# Patient Record
Sex: FEMALE | Race: BLACK OR AFRICAN AMERICAN
[De-identification: names, ages, dates, MRNs, and addresses within clinical notes are randomized per-mention and may not be internally consistent; named-entity substitution may affect disease eponyms.]

---

## 2017-08-15 ENCOUNTER — HOSPITAL ENCOUNTER (EMERGENCY)
Dept: HOSPITAL 54 - ER | Age: 37
Discharge: HOME | End: 2017-08-15
Payer: COMMERCIAL

## 2017-08-15 VITALS — SYSTOLIC BLOOD PRESSURE: 124 MMHG | DIASTOLIC BLOOD PRESSURE: 91 MMHG

## 2017-08-15 VITALS — HEIGHT: 60 IN | WEIGHT: 111 LBS | BODY MASS INDEX: 21.79 KG/M2

## 2017-08-15 DIAGNOSIS — R10.2: Primary | ICD-10-CM

## 2017-08-15 DIAGNOSIS — F12.10: ICD-10-CM

## 2017-08-15 LAB
HCG UR QL: NEGATIVE
KETONES UR STRIP-MCNC: 15 MG/DL
PH UR STRIP: 6.5 [PH] (ref 5–8)
RBC #/AREA URNS HPF: (no result) /HPF (ref 0–2)
UROBILINOGEN UR STRIP-MCNC: 1 EU/DL
WBC #/AREA URNS HPF: (no result) /HPF (ref 0–3)

## 2017-08-15 PROCEDURE — Z7610: HCPCS

## 2017-08-15 PROCEDURE — A4606 OXYGEN PROBE USED W OXIMETER: HCPCS

## 2017-11-02 ENCOUNTER — HOSPITAL ENCOUNTER (EMERGENCY)
Dept: HOSPITAL 10 - FTE | Age: 37
Discharge: HOME | End: 2017-11-02
Payer: COMMERCIAL

## 2017-11-02 VITALS
WEIGHT: 111.33 LBS | BODY MASS INDEX: 20.49 KG/M2 | HEIGHT: 62 IN | HEIGHT: 62 IN | WEIGHT: 111.33 LBS | BODY MASS INDEX: 20.49 KG/M2

## 2017-11-02 DIAGNOSIS — R10.2: ICD-10-CM

## 2017-11-02 DIAGNOSIS — Z3A.01: ICD-10-CM

## 2017-11-02 DIAGNOSIS — O26.891: Primary | ICD-10-CM

## 2017-11-02 PROCEDURE — 76801 OB US < 14 WKS SINGLE FETUS: CPT

## 2017-11-02 PROCEDURE — 85025 COMPLETE CBC W/AUTO DIFF WBC: CPT

## 2017-11-02 PROCEDURE — 86900 BLOOD TYPING SEROLOGIC ABO: CPT

## 2017-11-02 PROCEDURE — 84702 CHORIONIC GONADOTROPIN TEST: CPT

## 2017-11-02 PROCEDURE — 81003 URINALYSIS AUTO W/O SCOPE: CPT

## 2017-11-02 PROCEDURE — 36415 COLL VENOUS BLD VENIPUNCTURE: CPT

## 2017-11-02 PROCEDURE — 87591 N.GONORRHOEAE DNA AMP PROB: CPT

## 2017-11-02 PROCEDURE — 81001 URINALYSIS AUTO W/SCOPE: CPT

## 2017-11-02 PROCEDURE — 84703 CHORIONIC GONADOTROPIN ASSAY: CPT

## 2017-11-02 PROCEDURE — 76817 TRANSVAGINAL US OBSTETRIC: CPT

## 2017-11-02 PROCEDURE — 86901 BLOOD TYPING SEROLOGIC RH(D): CPT

## 2017-11-02 NOTE — RADRPT
PROCEDURE:   US Pelvis. 

 

CLINICAL INDICATION:   Pelvic pain 

 

TECHNIQUE:   Multiple sonographic images of the pelvis were obtained utilizing a transabdominal and 
endovaginal technique.   The images were reviewed on a PACS workstation. 

 

COMPARISON:   None. 

 

FINDINGS:

The uterus is normal in size and demonstrates a normal appearance of the myometrium.  The endometria
l stripe is heterogeneous in appearance and has the thickness of 15 mm.

No intrauterine gestation is noted.

The ovaries are normal in size and echogenicity. Normal Doppler flow is identified in both ovaries.

The right ovary measures 3.2 x 1.8 x 2.1 cm.

The left ovary measures 4.7 x 2.9 x 3.7 cm. There is a 2.3 cm hemorrhagic cyst in the left ovary.

There is a small amount of free fluid in the pelvis.

 

 

RPTAT: AA

 

 

IMPRESSION:

No intrauterine gestation visualized.

Small hemorrhagic cyst in the left ovary.

Small amount of free fluid in the cul-de-sac.

Differential diagnosis includes early pregnancy, missed  or ectopic pregnancy.  

Follow-up ultrasound and HCG levels is recommended.

_____________________________________________ 

.Jaguar Tillman MD, MD           Date    Time 

Electronically viewed and signed by .Jaguar Tillman MD, MD on 2017 19:48 

 

D:  2017 19:48  T:  2017 19:48

.S/

## 2017-11-02 NOTE — ERD
ER Documentation


Chief Complaint


Chief Complaint


burning urination x 1 week





HPI


37-year-old female presents emergency room painful urination, urgency and 

frequency for a week now.  She found an old prescription for Cipro and has 

taken it for 3 days twice a day but reports that it was an old prescription and 

 in .  There is associated suprapubic abdominal pain, she describes 

pelvic pain.  She states that she is sexually active with one partner her 

boyfriend, denies any excessive vaginal discharge, vaginal odor, vaginal 

bleeding.  Her last menstrual period was on .  She denies fevers or 

chills, vomiting or diarrhea.





ROS


All systems reviewed and are negative except as per history of present illness.





Medications


Home Meds


Active Scripts


Acetaminophen* (Tylophen*) 500 Mg Capsule, 1 CAP PO Q6H Y for PAIN AND OR 

ELEVATED TEMP, #20 CAP


   Prov:MEGAN QURESHI PA-C         17


Cephalexin* (Keflex*) 500 Mg Capsule, 500 MG PO TID for 7 Days, CAP


   Prov:MEGAN QURESHI PA-C         17


Reported Medications


[None]   No Conflict Check


   7/2/10





Allergies


Allergies:  


Coded Allergies:  


     No Known Drug Allergy (Verified  Allergy, Mild, 14)





PMhx/Soc


History of Surgery:  Yes (TUMOR REMOVED ABDOMEN, C SECTION X2, BREAST IMPLANT)


Anesthesia Reaction:  No


Hx Neurological Disorder:  No


Hx Respiratory Disorders:  No


Hx Cardiac Disorders:  No


Hx Psychiatric Problems:  No


Hx Miscellaneous Medical Probl:  No


Hx Alcohol Use:  Yes (OCCASSIONALLY)


Hx Substance Use:  No


Hx Tobacco Use:  No





Physical Exam


Vitals





Vital Signs








  Date Time  Temp Pulse Resp B/P Pulse Ox O2 Delivery O2 Flow Rate FiO2


 


17 15:53 97.6 88 130 67/ 98   








Physical Exam


General: Well-developed, well-nourished.  The patient appears in no acute 

distress.


HEENT: Head is normocephalic, atraumatic. No scleral icterus.  


Neck: Supple.  Nontender.


Lungs: Clear to auscultation.  Normal air movement.


Heart: Regular rate and rhythm.  S1 and S2 are normal.  No murmurs, gallops, or 

rubs.


Abdomen: Soft, suprapubic tenderness no rebound or guarding or masses, 

nondistended.  Bowel sounds are normoactive.


: white vaginal discharge, cervix is normal, os is closed, no masses, no cmt 

tenderness 


Extremities: No clubbing or cyanosis.  Normal pulses. Moving extremities x 4. 

No weakness.


Neurologic: Alert and oriented 3.  No focal deficits.


Skin: Normal turgor.  No rash or lesions.


Result Diagram:  


17





Results 24 hrs








 Laboratory Tests








Test


  17


17:30 17


19:05


 


Urine Color YELLOW  


 


Urine Clarity


  SLIGHTLY


CLOUDY 


 


 


Urine pH 6.0  


 


Urine Specific Gravity 1.018  


 


Urine Ketones 1+mg/dL  


 


Urine Nitrite NEGATIVEmg/dL  


 


Urine Bilirubin NEGATIVEmg/dL  


 


Urine Urobilinogen NEGATIVEmg/dL  


 


Urine Leukocyte Esterase NEGATIVELeu/ul  


 


Urine Microscopic RBC 0/HPF  


 


Urine Microscopic WBC 1/HPF  


 


Urine Squamous Epithelial


Cells FEW/HPF 


  


 


 


Urine Mucus FEW/HPF  


 


Urine Hemoglobin NEGATIVEmg/dL  


 


Urine Glucose NEGATIVEmg/dL  


 


Urine Total Protein NEGATIVEmg/dl  


 


Urine Pregnancy Test POSITIVE  


 


White Blood Count  5.910^3/ul 


 


Red Blood Count  4.4010^6/ul 


 


Hemoglobin  13.2g/dl 


 


Hematocrit  39.0% 


 


Mean Corpuscular Volume  88.6fl 


 


Mean Corpuscular Hemoglobin  30.0pg 


 


Mean Corpuscular Hemoglobin


Concent 


  33.8g/dl 


 


 


Red Cell Distribution Width  12.0% 


 


Platelet Count  44628^3/UL 


 


Mean Platelet Volume  11.0fl 


 


Neutrophils %  65.0% 


 


Lymphocytes %  27.7% 


 


Monocytes %  4.9% 


 


Eosinophils %  1.2% 


 


Basophils %  1.0% 


 


Nucleated Red Blood Cells %  0.0/100WBC 


 


Neutrophils #  3.910^3/ul 


 


Lymphocytes #  1.610^3/ul 


 


Monocytes #  0.310^3/ul 


 


Eosinophils #  0.110^3/ul 


 


Basophils #  0.110^3/ul 


 


Nucleated Red Blood Cells #  0.010^3/ul 


 


Beta HCG, Quantitative  54.8mIU/ml 











 DIAGNOSTIC IMAGING REPORT





 Patient: LIAM FERRARI   : 1980   Age: 37  Sex: F                   

     


 MR #:    [id number]   Acct #:   [id number]    DOS: 17 1854


 Ordering MD: MEGAN QURESHI PA-C   Location:  FTE   Room/Bed:                       

                     


 








PROCEDURE:   US Pelvis. 


 


CLINICAL INDICATION:   Pelvic pain 


 


TECHNIQUE:   Multiple sonographic images of the pelvis were obtained utilizing 

a transabdominal and endovaginal technique.   The images were reviewed on a 

PACS workstation. 


 


COMPARISON:   None. 


 


FINDINGS:


The uterus is normal in size and demonstrates a normal appearance of the 

myometrium.  The endometrial stripe is heterogeneous in appearance and has the 

thickness of 15 mm.


No intrauterine gestation is noted.


The ovaries are normal in size and echogenicity. Normal Doppler flow is 

identified in both ovaries.


The right ovary measures 3.2 x 1.8 x 2.1 cm.


The left ovary measures 4.7 x 2.9 x 3.7 cm. There is a 2.3 cm hemorrhagic cyst 

in the left ovary.


There is a small amount of free fluid in the pelvis.


 


 


RPTAT: AA


 


 


IMPRESSION:


No intrauterine gestation visualized.


Small hemorrhagic cyst in the left ovary.


Small amount of free fluid in the cul-de-sac.


Differential diagnosis includes early pregnancy, missed  or ectopic 

pregnancy.  


Follow-up ultrasound and HCG levels is recommended.


_____________________________________________ 


.Jaguar Tillman MD, MD           Date    Time 


Electronically viewed and signed by .Jaguar Tillman MD, MD on 2017 19:

48 


 


D:  2017 19:48  T:  2017 19:48


.S/





CC: MEGAN QURESHI PA-C





Procedures/[facility]


37-year-old female comes in with painful urination, urgency and frequency with 

pelvic pain, subsequently she was found to have a positive pregnancy test.  Her 

beta hCG is approximately 54, with no evidence of an IUP seen on the pelvic 

ultrasound.  She did have bleeding a few weeks ago, I suspect either a missed 

 miscarriage occurred or the patient is too early to see an 

intrauterine pregnancy.  She has not had any prior ultrasounds or blood work 

regarding this given that she is did not know she was pregnant prior to today.  

At this time given her hCG is only 54, it is unlikely that she has an ectopic 

pregnancy and will need repeat testing in 48 hours.  Pelvic examination does 

not show cervical motion tenderness.  Gonorrhea and Chlamydia was considered 

given her history of PID, she has been here evaluated for pelvic inflammatory 

disease in the past, at this time given that she does not have signs or 

symptoms of PID, will defer treatment for cervicitis, gonorrhea and chlamydia 

are pending at this time.  She is symptomatic at this time complaining of 

painful urination, urgency frequency and her urine analysis does not show any 

distinct evidence of UTI given that she was recently of self treating with 

Cipro that was prescribed to her previously.





Departure


Diagnosis:  


 Primary Impression:  


 Pregnancy


 Additional Impression:  


 Pelvic pain











MEGAN QURESHI PA-C 2017 18:12

## 2017-11-02 NOTE — ERD
ER Documentation


Chief Complaint


Chief Complaint


burning urination x 1 week





HPI


37-year-old female presents emergency room painful urination, urgency and 

frequency for a week now.  She found an old prescription for Cipro and has 

taken it for 3 days twice a day but reports that it was an old prescription and 

 in .  There is associated suprapubic abdominal pain, she describes 

pelvic pain.  She states that she is sexually active with one partner her 

boyfriend, denies any excessive vaginal discharge, vaginal odor, vaginal 

bleeding.  Her last menstrual period was on .  She denies fevers or 

chills, vomiting or diarrhea.





ROS


All systems reviewed and are negative except as per history of present illness.





Medications


Home Meds


Active Scripts


Acetaminophen* (Tylophen*) 500 Mg Capsule, 1 CAP PO Q6H Y for PAIN AND OR 

ELEVATED TEMP, #20 CAP


   Prov:MEGAN QURESHI PA-C         17


Cephalexin* (Keflex*) 500 Mg Capsule, 500 MG PO TID for 7 Days, CAP


   Prov:MEGAN QURESHI PA-C         17


Reported Medications


[None]   No Conflict Check


   7/2/10





Allergies


Allergies:  


Coded Allergies:  


     No Known Drug Allergy (Verified  Allergy, Mild, 14)





PMhx/Soc


History of Surgery:  Yes (TUMOR REMOVED ABDOMEN, C SECTION X2, BREAST IMPLANT)


Anesthesia Reaction:  No


Hx Neurological Disorder:  No


Hx Respiratory Disorders:  No


Hx Cardiac Disorders:  No


Hx Psychiatric Problems:  No


Hx Miscellaneous Medical Probl:  No


Hx Alcohol Use:  Yes (OCCASSIONALLY)


Hx Substance Use:  No


Hx Tobacco Use:  No





Physical Exam


Vitals





Vital Signs








  Date Time  Temp Pulse Resp B/P Pulse Ox O2 Delivery O2 Flow Rate FiO2


 


17 15:53 97.6 88 130 67/ 98   








Physical Exam


General: Well-developed, well-nourished.  The patient appears in no acute 

distress.


HEENT: Head is normocephalic, atraumatic. No scleral icterus.  


Neck: Supple.  Nontender.


Lungs: Clear to auscultation.  Normal air movement.


Heart: Regular rate and rhythm.  S1 and S2 are normal.  No murmurs, gallops, or 

rubs.


Abdomen: Soft, suprapubic tenderness no rebound or guarding or masses, 

nondistended.  Bowel sounds are normoactive.


: white vaginal discharge, cervix is normal, os is closed, no masses, no cmt 

tenderness 


Extremities: No clubbing or cyanosis.  Normal pulses. Moving extremities x 4. 

No weakness.


Neurologic: Alert and oriented 3.  No focal deficits.


Skin: Normal turgor.  No rash or lesions.


Result Diagram:  


17





Results 24 hrs








 Laboratory Tests








Test


  17


17:30 17


19:05


 


Urine Color YELLOW  


 


Urine Clarity


  SLIGHTLY


CLOUDY 


 


 


Urine pH 6.0  


 


Urine Specific Gravity 1.018  


 


Urine Ketones 1+mg/dL  


 


Urine Nitrite NEGATIVEmg/dL  


 


Urine Bilirubin NEGATIVEmg/dL  


 


Urine Urobilinogen NEGATIVEmg/dL  


 


Urine Leukocyte Esterase NEGATIVELeu/ul  


 


Urine Microscopic RBC 0/HPF  


 


Urine Microscopic WBC 1/HPF  


 


Urine Squamous Epithelial


Cells FEW/HPF 


  


 


 


Urine Mucus FEW/HPF  


 


Urine Hemoglobin NEGATIVEmg/dL  


 


Urine Glucose NEGATIVEmg/dL  


 


Urine Total Protein NEGATIVEmg/dl  


 


Urine Pregnancy Test POSITIVE  


 


White Blood Count  5.910^3/ul 


 


Red Blood Count  4.4010^6/ul 


 


Hemoglobin  13.2g/dl 


 


Hematocrit  39.0% 


 


Mean Corpuscular Volume  88.6fl 


 


Mean Corpuscular Hemoglobin  30.0pg 


 


Mean Corpuscular Hemoglobin


Concent 


  33.8g/dl 


 


 


Red Cell Distribution Width  12.0% 


 


Platelet Count  56634^3/UL 


 


Mean Platelet Volume  11.0fl 


 


Neutrophils %  65.0% 


 


Lymphocytes %  27.7% 


 


Monocytes %  4.9% 


 


Eosinophils %  1.2% 


 


Basophils %  1.0% 


 


Nucleated Red Blood Cells %  0.0/100WBC 


 


Neutrophils #  3.910^3/ul 


 


Lymphocytes #  1.610^3/ul 


 


Monocytes #  0.310^3/ul 


 


Eosinophils #  0.110^3/ul 


 


Basophils #  0.110^3/ul 


 


Nucleated Red Blood Cells #  0.010^3/ul 


 


Beta HCG, Quantitative  54.8mIU/ml 











 DIAGNOSTIC IMAGING REPORT





 Patient: LIAM FERRARI   : 1980   Age: 37  Sex: F                   

     


 MR #:    [id number]   Acct #:   [id number]    DOS: 17 1854


 Ordering MD: MEGAN QURESHI PA-C   Location:  FTE   Room/Bed:                       

                     


 








PROCEDURE:   US Pelvis. 


 


CLINICAL INDICATION:   Pelvic pain 


 


TECHNIQUE:   Multiple sonographic images of the pelvis were obtained utilizing 

a transabdominal and endovaginal technique.   The images were reviewed on a 

PACS workstation. 


 


COMPARISON:   None. 


 


FINDINGS:


The uterus is normal in size and demonstrates a normal appearance of the 

myometrium.  The endometrial stripe is heterogeneous in appearance and has the 

thickness of 15 mm.


No intrauterine gestation is noted.


The ovaries are normal in size and echogenicity. Normal Doppler flow is 

identified in both ovaries.


The right ovary measures 3.2 x 1.8 x 2.1 cm.


The left ovary measures 4.7 x 2.9 x 3.7 cm. There is a 2.3 cm hemorrhagic cyst 

in the left ovary.


There is a small amount of free fluid in the pelvis.


 


 


RPTAT: AA


 


 


IMPRESSION:


No intrauterine gestation visualized.


Small hemorrhagic cyst in the left ovary.


Small amount of free fluid in the cul-de-sac.


Differential diagnosis includes early pregnancy, missed  or ectopic 

pregnancy.  


Follow-up ultrasound and HCG levels is recommended.


_____________________________________________ 


.Jaguar Tillman MD, MD           Date    Time 


Electronically viewed and signed by .Jaguar Tillman MD, MD on 2017 19:

48 


 


D:  2017 19:48  T:  2017 19:48


.S/





CC: MEGAN QURESHI PA-C





Procedures/[facility]


37-year-old female comes in with painful urination, urgency and frequency with 

pelvic pain, subsequently she was found to have a positive pregnancy test.  Her 

beta hCG is approximately 54, with no evidence of an IUP seen on the pelvic 

ultrasound.  She did have bleeding a few weeks ago, I suspect either a missed 

 miscarriage occurred or the patient is too early to see an 

intrauterine pregnancy.  She has not had any prior ultrasounds or blood work 

regarding this given that she is did not know she was pregnant prior to today.  

At this time given her hCG is only 54, it is unlikely that she has an ectopic 

pregnancy and will need repeat testing in 48 hours.  Pelvic examination does 

not show cervical motion tenderness.  Gonorrhea and Chlamydia was considered 

given her history of PID, she has been here evaluated for pelvic inflammatory 

disease in the past, at this time given that she does not have signs or 

symptoms of PID, will defer treatment for cervicitis, gonorrhea and chlamydia 

are pending at this time.  She is symptomatic at this time complaining of 

painful urination, urgency frequency and her urine analysis does not show any 

distinct evidence of UTI given that she was recently of self treating with 

Cipro that was prescribed to her previously.





Departure


Diagnosis:  


 Primary Impression:  


 Pregnancy


 Additional Impression:  


 Pelvic pain











MEGAN QURESHI PA-C 2017 18:12

## 2017-11-02 NOTE — ERD
ER Documentation


Chief Complaint


Chief Complaint


burning urination x 1 week





HPI


37-year-old female presents emergency room painful urination, urgency and 

frequency for a week now.  She found an old prescription for Cipro and has 

taken it for 3 days twice a day but reports that it was an old prescription and 

 in .  There is associated suprapubic abdominal pain, she describes 

pelvic pain.  She states that she is sexually active with one partner her 

boyfriend, denies any excessive vaginal discharge, vaginal odor, vaginal 

bleeding.  Her last menstrual period was on .  She denies fevers or 

chills, vomiting or diarrhea.





ROS


All systems reviewed and are negative except as per history of present illness.





Medications


Home Meds


Active Scripts


Acetaminophen* (Tylophen*) 500 Mg Capsule, 1 CAP PO Q6H Y for PAIN AND OR 

ELEVATED TEMP, #20 CAP


   Prov:MEGAN QURESHI PA-C         17


Cephalexin* (Keflex*) 500 Mg Capsule, 500 MG PO TID for 7 Days, CAP


   Prov:MEGAN QURESHI PA-C         17


Reported Medications


[None]   No Conflict Check


   7/2/10





Allergies


Allergies:  


Coded Allergies:  


     No Known Drug Allergy (Verified  Allergy, Mild, 14)





PMhx/Soc


History of Surgery:  Yes (TUMOR REMOVED ABDOMEN, C SECTION X2, BREAST IMPLANT)


Anesthesia Reaction:  No


Hx Neurological Disorder:  No


Hx Respiratory Disorders:  No


Hx Cardiac Disorders:  No


Hx Psychiatric Problems:  No


Hx Miscellaneous Medical Probl:  No


Hx Alcohol Use:  Yes (OCCASSIONALLY)


Hx Substance Use:  No


Hx Tobacco Use:  No





Physical Exam


Vitals





Vital Signs








  Date Time  Temp Pulse Resp B/P Pulse Ox O2 Delivery O2 Flow Rate FiO2


 


17 15:53 97.6 88 130 67/ 98   








Physical Exam


General: Well-developed, well-nourished.  The patient appears in no acute 

distress.


HEENT: Head is normocephalic, atraumatic. No scleral icterus.  


Neck: Supple.  Nontender.


Lungs: Clear to auscultation.  Normal air movement.


Heart: Regular rate and rhythm.  S1 and S2 are normal.  No murmurs, gallops, or 

rubs.


Abdomen: Soft, suprapubic tenderness no rebound or guarding or masses, 

nondistended.  Bowel sounds are normoactive.


: white vaginal discharge, cervix is normal, os is closed, no masses, no cmt 

tenderness 


Extremities: No clubbing or cyanosis.  Normal pulses. Moving extremities x 4. 

No weakness.


Neurologic: Alert and oriented 3.  No focal deficits.


Skin: Normal turgor.  No rash or lesions.


Result Diagram:  


17





Results 24 hrs








 Laboratory Tests








Test


  17


17:30 17


19:05


 


Urine Color YELLOW  


 


Urine Clarity


  SLIGHTLY


CLOUDY 


 


 


Urine pH 6.0  


 


Urine Specific Gravity 1.018  


 


Urine Ketones 1+mg/dL  


 


Urine Nitrite NEGATIVEmg/dL  


 


Urine Bilirubin NEGATIVEmg/dL  


 


Urine Urobilinogen NEGATIVEmg/dL  


 


Urine Leukocyte Esterase NEGATIVELeu/ul  


 


Urine Microscopic RBC 0/HPF  


 


Urine Microscopic WBC 1/HPF  


 


Urine Squamous Epithelial


Cells FEW/HPF 


  


 


 


Urine Mucus FEW/HPF  


 


Urine Hemoglobin NEGATIVEmg/dL  


 


Urine Glucose NEGATIVEmg/dL  


 


Urine Total Protein NEGATIVEmg/dl  


 


Urine Pregnancy Test POSITIVE  


 


White Blood Count  5.910^3/ul 


 


Red Blood Count  4.4010^6/ul 


 


Hemoglobin  13.2g/dl 


 


Hematocrit  39.0% 


 


Mean Corpuscular Volume  88.6fl 


 


Mean Corpuscular Hemoglobin  30.0pg 


 


Mean Corpuscular Hemoglobin


Concent 


  33.8g/dl 


 


 


Red Cell Distribution Width  12.0% 


 


Platelet Count  98343^3/UL 


 


Mean Platelet Volume  11.0fl 


 


Neutrophils %  65.0% 


 


Lymphocytes %  27.7% 


 


Monocytes %  4.9% 


 


Eosinophils %  1.2% 


 


Basophils %  1.0% 


 


Nucleated Red Blood Cells %  0.0/100WBC 


 


Neutrophils #  3.910^3/ul 


 


Lymphocytes #  1.610^3/ul 


 


Monocytes #  0.310^3/ul 


 


Eosinophils #  0.110^3/ul 


 


Basophils #  0.110^3/ul 


 


Nucleated Red Blood Cells #  0.010^3/ul 


 


Beta HCG, Quantitative  54.8mIU/ml 











 DIAGNOSTIC IMAGING REPORT





 Patient: LIAM FERRARI   : 1980   Age: 37  Sex: F                   

     


 MR #:    [id number]   Acct #:   [id number]    DOS: 17 1854


 Ordering MD: MEGAN QURESHI PA-C   Location:  FTE   Room/Bed:                       

                     


 








PROCEDURE:   US Pelvis. 


 


CLINICAL INDICATION:   Pelvic pain 


 


TECHNIQUE:   Multiple sonographic images of the pelvis were obtained utilizing 

a transabdominal and endovaginal technique.   The images were reviewed on a 

PACS workstation. 


 


COMPARISON:   None. 


 


FINDINGS:


The uterus is normal in size and demonstrates a normal appearance of the 

myometrium.  The endometrial stripe is heterogeneous in appearance and has the 

thickness of 15 mm.


No intrauterine gestation is noted.


The ovaries are normal in size and echogenicity. Normal Doppler flow is 

identified in both ovaries.


The right ovary measures 3.2 x 1.8 x 2.1 cm.


The left ovary measures 4.7 x 2.9 x 3.7 cm. There is a 2.3 cm hemorrhagic cyst 

in the left ovary.


There is a small amount of free fluid in the pelvis.


 


 


RPTAT: AA


 


 


IMPRESSION:


No intrauterine gestation visualized.


Small hemorrhagic cyst in the left ovary.


Small amount of free fluid in the cul-de-sac.


Differential diagnosis includes early pregnancy, missed  or ectopic 

pregnancy.  


Follow-up ultrasound and HCG levels is recommended.


_____________________________________________ 


.Jaguar Tillman MD, MD           Date    Time 


Electronically viewed and signed by .Jaguar Tillman MD, MD on 2017 19:

48 


 


D:  2017 19:48  T:  2017 19:48


.S/





CC: MEGAN QURESHI PA-C





Procedures/[facility]


37-year-old female comes in with painful urination, urgency and frequency with 

pelvic pain, subsequently she was found to have a positive pregnancy test.  Her 

beta hCG is approximately 54, with no evidence of an IUP seen on the pelvic 

ultrasound.  She did have bleeding a few weeks ago, I suspect either a missed 

 miscarriage occurred or the patient is too early to see an 

intrauterine pregnancy.  She has not had any prior ultrasounds or blood work 

regarding this given that she is did not know she was pregnant prior to today.  

At this time given her hCG is only 54, it is unlikely that she has an ectopic 

pregnancy and will need repeat testing in 48 hours.  Pelvic examination does 

not show cervical motion tenderness.  Gonorrhea and Chlamydia was considered 

given her history of PID, she has been here evaluated for pelvic inflammatory 

disease in the past, at this time given that she does not have signs or 

symptoms of PID, will defer treatment for cervicitis, gonorrhea and chlamydia 

are pending at this time.  She is symptomatic at this time complaining of 

painful urination, urgency frequency and her urine analysis does not show any 

distinct evidence of UTI given that she was recently of self treating with 

Cipro that was prescribed to her previously.





Departure


Diagnosis:  


 Primary Impression:  


 Pregnancy


 Additional Impression:  


 Pelvic pain











MEGAN QURESHI PA-C 2017 18:12

## 2018-08-11 ENCOUNTER — HOSPITAL ENCOUNTER (EMERGENCY)
Dept: HOSPITAL 54 - ER | Age: 38
Discharge: HOME | End: 2018-08-11
Payer: MEDICAID

## 2018-08-11 VITALS — WEIGHT: 117 LBS | HEIGHT: 60 IN | BODY MASS INDEX: 22.97 KG/M2

## 2018-08-11 VITALS — DIASTOLIC BLOOD PRESSURE: 45 MMHG | SYSTOLIC BLOOD PRESSURE: 125 MMHG

## 2018-08-11 DIAGNOSIS — R07.89: Primary | ICD-10-CM
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
## 2018-08-11 DIAGNOSIS — F10.10: ICD-10-CM

## 2018-08-11 DIAGNOSIS — Z98.890: ICD-10-CM

## 2018-08-11 PROCEDURE — A4606 OXYGEN PROBE USED W OXIMETER: HCPCS

## 2018-08-11 PROCEDURE — Z7610: HCPCS

## 2018-08-11 PROCEDURE — 71045 X-RAY EXAM CHEST 1 VIEW: CPT

## 2018-08-11 PROCEDURE — 93005 ELECTROCARDIOGRAM TRACING: CPT

## 2018-08-11 PROCEDURE — 99284 EMERGENCY DEPT VISIT MOD MDM: CPT

## 2022-11-01 ENCOUNTER — HOSPITAL ENCOUNTER (EMERGENCY)
Dept: HOSPITAL 54 - ER | Age: 42
Discharge: HOME | End: 2022-11-01
Payer: MEDICAID

## 2022-11-01 VITALS — HEIGHT: 60 IN | WEIGHT: 140 LBS | BODY MASS INDEX: 27.48 KG/M2

## 2022-11-01 VITALS — DIASTOLIC BLOOD PRESSURE: 70 MMHG | SYSTOLIC BLOOD PRESSURE: 150 MMHG

## 2022-11-01 DIAGNOSIS — M67.431: ICD-10-CM

## 2022-11-01 DIAGNOSIS — M25.531: Primary | ICD-10-CM

## 2024-06-12 ENCOUNTER — HOSPITAL ENCOUNTER (EMERGENCY)
Dept: HOSPITAL 54 - ER | Age: 44
Discharge: HOME | End: 2024-06-12
Payer: MEDICAID

## 2024-06-12 VITALS — BODY MASS INDEX: 31.61 KG/M2 | HEIGHT: 60 IN | WEIGHT: 161 LBS

## 2024-06-12 VITALS — DIASTOLIC BLOOD PRESSURE: 79 MMHG | TEMPERATURE: 97.9 F | SYSTOLIC BLOOD PRESSURE: 142 MMHG | OXYGEN SATURATION: 100 %

## 2024-06-12 DIAGNOSIS — N83.12: Primary | ICD-10-CM

## 2024-06-12 DIAGNOSIS — R10.2: ICD-10-CM

## 2024-06-12 LAB
ALBUMIN SERPL BCP-MCNC: 3.6 G/DL (ref 3.4–5)
ALP SERPL-CCNC: 86 U/L (ref 46–116)
ALT SERPL W P-5'-P-CCNC: 27 U/L (ref 12–78)
APPEARANCE UR: CLEAR
AST SERPL W P-5'-P-CCNC: 18 U/L (ref 15–37)
BASOPHILS # BLD AUTO: 0 K/UL (ref 0–0.2)
BASOPHILS NFR BLD AUTO: 0.4 % (ref 0–2)
BILIRUB DIRECT SERPL-MCNC: 0.2 MG/DL (ref 0–0.2)
BILIRUB SERPL-MCNC: 0.9 MG/DL (ref 0.2–1)
BILIRUB UR QL STRIP: NEGATIVE
BUN SERPL-MCNC: 6 MG/DL (ref 7–18)
CALCIUM SERPL-MCNC: 9.3 MG/DL (ref 8.5–10.1)
CHLORIDE SERPL-SCNC: 102 MMOL/L (ref 98–107)
CO2 SERPL-SCNC: 29 MMOL/L (ref 21–32)
COLOR UR: YELLOW
CREAT SERPL-MCNC: 0.8 MG/DL (ref 0.6–1.3)
EOSINOPHIL # BLD AUTO: 0.1 K/UL (ref 0–0.7)
EOSINOPHIL NFR BLD AUTO: 1.2 % (ref 0–6)
ERYTHROCYTE [DISTWIDTH] IN BLOOD BY AUTOMATED COUNT: 13 % (ref 11.5–15)
GLUCOSE SERPL-MCNC: 120 MG/DL (ref 74–106)
GLUCOSE UR STRIP-MCNC: NEGATIVE MG/DL
HCG UR QL: NEGATIVE
HCT VFR BLD AUTO: 38 % (ref 33–45)
HGB BLD-MCNC: 13 G/DL (ref 11.5–14.8)
HGB UR QL STRIP: NEGATIVE ERY/UL
KETONES UR STRIP-MCNC: NEGATIVE MG/DL
LEUKOCYTE ESTERASE UR QL STRIP: NEGATIVE
LIPASE SERPL-CCNC: 22 U/L (ref 16–77)
LYMPHOCYTES NFR BLD AUTO: 1.4 K/UL (ref 0.8–4.8)
LYMPHOCYTES NFR BLD AUTO: 27.1 % (ref 20–44)
MCH RBC QN AUTO: 30 PG (ref 26–33)
MCHC RBC AUTO-ENTMCNC: 34 G/DL (ref 31–36)
MCV RBC AUTO: 87 FL (ref 82–100)
MONOCYTES NFR BLD AUTO: 0.2 K/UL (ref 0.1–1.3)
MONOCYTES NFR BLD AUTO: 4.6 % (ref 2–12)
NEUTROPHILS # BLD AUTO: 3.5 K/UL (ref 1.8–8.9)
NEUTROPHILS NFR BLD AUTO: 66.7 % (ref 43–81)
NITRITE UR QL STRIP: NEGATIVE
PH UR STRIP: 6 [PH] (ref 5–8)
PLATELET # BLD AUTO: 297 K/UL (ref 150–450)
POTASSIUM SERPL-SCNC: 3.1 MMOL/L (ref 3.5–5.1)
PROT SERPL-MCNC: 8 G/DL (ref 6.4–8.2)
PROT UR QL STRIP: (no result) MG/DL
RBC # BLD AUTO: 4.38 MIL/UL (ref 4–5.2)
SODIUM SERPL-SCNC: 138 MMOL/L (ref 136–145)
SP GR UR STRIP: 1.02 (ref 1–1.03)
UROBILINOGEN UR STRIP-MCNC: 1 EU/DL
WBC NRBC COR # BLD AUTO: 5.2 K/UL (ref 4.3–11)

## 2024-06-12 PROCEDURE — 76856 US EXAM PELVIC COMPLETE: CPT

## 2024-06-12 PROCEDURE — 74177 CT ABD & PELVIS W/CONTRAST: CPT

## 2024-06-12 PROCEDURE — 99285 EMERGENCY DEPT VISIT HI MDM: CPT

## 2024-06-12 PROCEDURE — 36415 COLL VENOUS BLD VENIPUNCTURE: CPT

## 2024-06-12 PROCEDURE — 80076 HEPATIC FUNCTION PANEL: CPT

## 2024-06-12 PROCEDURE — 83690 ASSAY OF LIPASE: CPT

## 2024-06-12 PROCEDURE — 84703 CHORIONIC GONADOTROPIN ASSAY: CPT

## 2024-06-12 PROCEDURE — 80048 BASIC METABOLIC PNL TOTAL CA: CPT

## 2024-06-12 PROCEDURE — 81003 URINALYSIS AUTO W/O SCOPE: CPT

## 2024-06-12 PROCEDURE — 85025 COMPLETE CBC W/AUTO DIFF WBC: CPT
